# Patient Record
Sex: MALE | Race: WHITE | NOT HISPANIC OR LATINO | Employment: UNEMPLOYED | URBAN - METROPOLITAN AREA
[De-identification: names, ages, dates, MRNs, and addresses within clinical notes are randomized per-mention and may not be internally consistent; named-entity substitution may affect disease eponyms.]

---

## 2023-08-03 ENCOUNTER — OFFICE VISIT (OUTPATIENT)
Dept: ORTHOPEDIC SURGERY | Facility: CLINIC | Age: 28
End: 2023-08-03

## 2023-08-03 VITALS
BODY MASS INDEX: 24.38 KG/M2 | WEIGHT: 190 LBS | DIASTOLIC BLOOD PRESSURE: 80 MMHG | HEIGHT: 74 IN | HEART RATE: 73 BPM | SYSTOLIC BLOOD PRESSURE: 135 MMHG

## 2023-08-03 DIAGNOSIS — S52.124A CLOSED NONDISPLACED FRACTURE OF HEAD OF RIGHT RADIUS, INITIAL ENCOUNTER: Primary | ICD-10-CM

## 2023-08-03 RX ORDER — OMEPRAZOLE 40 MG/1
1 CAPSULE, DELAYED RELEASE ORAL DAILY
COMMUNITY
Start: 2023-04-17

## 2023-08-03 RX ORDER — HYDROCODONE BITARTRATE AND ACETAMINOPHEN 5; 325 MG/1; MG/1
TABLET ORAL
COMMUNITY
Start: 2023-08-02

## 2023-08-03 RX ORDER — OXYCODONE HYDROCHLORIDE AND ACETAMINOPHEN 5; 325 MG/1; MG/1
1 TABLET ORAL EVERY 6 HOURS PRN
Qty: 30 TABLET | Refills: 0 | Status: SHIPPED | OUTPATIENT
Start: 2023-08-03

## 2023-08-07 ENCOUNTER — TELEPHONE (OUTPATIENT)
Dept: ORTHOPEDIC SURGERY | Facility: CLINIC | Age: 28
End: 2023-08-07

## 2023-08-07 NOTE — TELEPHONE ENCOUNTER
Called patient to reschedule 8-28-23 appt with Dr. Bueno due to provider being in surgery that day. Left voicemail about appt change, and moved appt to 8-31-23 at 9:45. Asked patient to call back if time and date did not work for him.

## 2023-08-07 NOTE — TELEPHONE ENCOUNTER
Caller: Gary Swartz    Relationship to patient: Self    Best call back number: [phone]    Patient is needing: PATIENT WOULD LIKE TO KNOW HIS RESTRICTIONS AND GUIDELINES PERTAINING TO HIS SLING. ALSO INCREASED PAIN RADIATING UP HIS ARM

## 2023-08-07 NOTE — TELEPHONE ENCOUNTER
Discussed treatment options at length with patient at today's visit.  I discussed with the patient that I like him to wear the sling for the next week or so.  Since he is self-pay he is not interested in physical therapy.  I discussed exercises that I would like him to work on mainly stretching to achieve full extension and flexion and pronation and supination.  Isent the patient a prescription of Percocet 5/3/2025 to his pharmacy in [place].  He should wear the sling as needed for comfort and recommend ice and elevation as needed for pain.  I will plan to see the patient back in 3 weeks.  3 weeks with 2 views of the right elbow    DX:   Closed nondisplaced fracture of head of right radius, initial encounter        Patient aware and agreeable.

## 2023-08-09 ENCOUNTER — TELEPHONE (OUTPATIENT)
Dept: ORTHOPEDIC SURGERY | Facility: CLINIC | Age: 28
End: 2023-08-09

## 2023-08-09 NOTE — TELEPHONE ENCOUNTER
Patient is asking about restrictions for his right elbow.  Says that when he is doing his exercises should he push past the pain or stop when it starts to hurt?  Also asking about pain that is going up into his shoulder and if that is normal or can be expected from his fracture.

## 2023-08-10 ENCOUNTER — OFFICE VISIT (OUTPATIENT)
Dept: ORTHOPEDIC SURGERY | Facility: CLINIC | Age: 28
End: 2023-08-10

## 2023-08-10 VITALS — HEIGHT: 74 IN | WEIGHT: 190 LBS | BODY MASS INDEX: 24.38 KG/M2

## 2023-08-10 DIAGNOSIS — S52.124D CLOSED NONDISPLACED FRACTURE OF HEAD OF RIGHT RADIUS WITH ROUTINE HEALING, SUBSEQUENT ENCOUNTER: Primary | ICD-10-CM

## 2023-08-10 NOTE — PROGRESS NOTES
"Subjective:     Patient ID: Gary Swartz is a 28 y.o. male.    Chief Complaint:    History of Present Illness  Gary Swartz returns to clinic today for evaluation of right elbow radial head fracture.  He states that he has quite a few questions and is here today to get them answered.  The patient is self-pay and is not interested in physical therapy because he does not have the money or financial resources to pay for it.     Social History     Occupational History    Not on file   Tobacco Use    Smoking status: Never     Passive exposure: Never    Smokeless tobacco: Never   Vaping Use    Vaping Use: Every day    Substances: Nicotine    Devices: Disposable   Substance and Sexual Activity    Alcohol use: Not Currently    Drug use: Never    Sexual activity: Not on file      Past Medical History:   Diagnosis Date    Fracture of right femur     Fracture, metacarpal     Heart murmur     PTSD (post-traumatic stress disorder)     Scoliosis      Past Surgical History:   Procedure Laterality Date    ORIF FEMUR FRACTURE Right     21 YRS AGO       No family history on file.              Objective:  Vitals:    08/10/23 0929   Weight: 86.2 kg (190 lb)   Height: 188 cm (74\")         08/10/23  0929   Weight: 86.2 kg (190 lb)     Body mass index is 24.39 kg/mý.        Right Elbow Exam     Tenderness   The patient is experiencing tenderness in the radial capitellar joint, lateral epicondyle and radial head.     Range of Motion   Extension:  20   Flexion:  90   Pronation:  normal   Supination:  normal     Muscle Strength   Pronation:  4/5   Supination:  4/5     Other   Erythema: absent  Scars: absent  Sensation: normal  Pulse: present             Imaging: 3 views of the right elbow were ordered and reviewed by myself in the office today  Indication: Right elbow pain  Findings: X-rays demonstrate a nondisplaced intra-articular radial head fracture.  Radiocapitellar joint and ulnohumeral joints are concentrically reduced without " signs of widening or subluxation.  No signs of increased displacement from prior radiographs  Comparative studies: X-rays taken in the ER    Assessment:        1. Closed nondisplaced fracture of head of right radius with routine healing, subsequent encounter           Plan:          Discussed treatment options at length with patient at today's visit.  I discussed with the patient that I would like him to begin working on range of motion exercises like mentioned at last visit.  I discussed with him that ideally I would refer him to occupational and physical therapy for a treatment plan and gradually increased motion and exercises.  He states that he does not have insurance and no financial means to pay for that.  He would like to be given a protocol from the physical therapist and this was printed out.  I demonstrated exercises for him to work on on his own.  I would like to see him back in 3 to 4 weeks.  Additionally I recommended that he avoid high risk activities for falls and avoid lifting anything heavy with his right hand in the time being.  Follow up: 4 weeks with 3 views of the right elbow      Gary Swartz was in agreement with plan and had all questions answered.     Medications:  No orders of the defined types were placed in this encounter.      Followup:  No follow-ups on file.    Diagnoses and all orders for this visit:    1. Closed nondisplaced fracture of head of right radius with routine healing, subsequent encounter (Primary)  -     XR Elbow 2 View Right          Dictated utilizing Dragon dictation